# Patient Record
Sex: FEMALE | Race: AMERICAN INDIAN OR ALASKA NATIVE
[De-identification: names, ages, dates, MRNs, and addresses within clinical notes are randomized per-mention and may not be internally consistent; named-entity substitution may affect disease eponyms.]

---

## 2017-01-12 NOTE — XRAY REPORT
CHEST TWO VIEWS: 01/12/17 11:19:00



CLINICAL: Hypertension.



COMPARISON: 09/30/11



FINDINGS: Normal heart and pulmonary vasculature.Slightly increased 

aortic ectasia.  The lungs are normally expanded and clear.Degenerative 

change in spine.



IMPRESSION: No acute cardiopulmonary process.  Increased aortic 

ectasia.  Consider CT for further evaluation of the aorta.

## 2017-02-08 NOTE — ADMIT CRITERIA FORM
Admission Criteria Documentation: 





            AMBULATORY SURGERY  EXCEPTION CRITERIA 





Ambulatory Surgery Exception Criteria


                                                                               (

Place 'X' for any and all applicable criteria): 





Surgery or procedure performed on ambulatory basis may require inpatient stay 

for[A] ANY ONE of the following(1)(2)(3)(4)(5)(6)(7)(8)(9):


[X] I.   A preoperative situation, condition, or finding that warrants 

inpatient stay as indicated by ANY  ONE of the following:


   []   a)   Inpatient care needed because of severity of a disease or 

condition rather than the surgery (eg, 


              severe cardiac or respiratory disease, severe infection) (15) (16

) (17) (18)


   []   b)   Emergent procedure (eg, angioplasty for acute ischemia)(19)


   []   c)   Complex surgical approach or situation as indicated by ANY  ONE of 

the following(3):


        []   i)    Open approach needed instead of usual endoscopic, 

transcatheter, or other less invasive procedure


        []   ii)   Difficult approach because of previous operation


        []   iii)  Airway monitoring required after open neck procedures(20)(21)


        []   iv)  Large mass requiring unusually extensive dissection


        []   v)  Additional complicating feature requiring inpatient care (eg, 

drain management)(22(23):


   [X]   d)   Major surgery in a pt with high anesthetic risk as indicated by 

ANY  ONE of the following (2)(3)(5)(7)(8):


        [X]   i)     ASA risk class III or higher (severe systemic disease 

impairing function) [D]


        []   ii)    Advanced age (eg, older than 85 years)(14)(24)             

                   


        []   iii)   Symptomatic heart failure(25)


        []   iv)   Symptomatic asthma or COPD(8)(21)


        []    v)   Morbid obesity with hemodynamic or respiratory problems(20)(

21)(26)(27)


        []   vi)   Obstructive sleep apnea(20)(21)


        []   vii)   Former premature infants who are younger than 60 weeks


        []   viii)  High risk for severe postoperative abnormalities (eg, 

severe postoperative hypocalcemia 


                    after parathyroidectomy for severe hyperparathyroidism)(27)(

28)


        []   ix)   Unstable angina(25) 


   []   e)  Drug-related risk requiring inpatient stay as indicated by ANY  ONE

  of the following(5)(10)(14)(32)(33)


        []   i)     Procedure requires discontinuing drugs or other therapy (eg

, antiarrhythmic    medication,   


                    antiseizure medication), which necessitates inpatient 

observation or treatment.(18)(31)


        []   ii)    Major surgery and high risk drug use as indicated by ANY  

ONE   of the following:


             []     1)    Active abuse of cocaine or similar drug      


             []     2)    Monoamine oxidase inhibitor use


             []     3)    Other drug identified as posing risk


   []   f)   Inadequate outpatient care situation as indicated by ANY  ONE  of 

the following(5)(10)(14)(32)(33)


        []   i)   Patient lives remote from medical facility and procedure has 

urgent complication potential, 


                  and temporary nearby residence cannot be arranged


        []  ii)   Patient will have postprocedure incapacitation and inadequate 

assistance at home, or alternative level of care cannot be arranged.


        []  iii)   Patient will have long general anesthesia or procedure side 

effect resolution time, and 


                  competent person to stay with patient on first postoperative 

night at home or alternative  level of care cannot be arranged.


        []iv)    Other inadequate outpatient situation that cannot be handled 

by other means


[] II.  A perioperative event, condition, or finding that warrants inpatient 

stay as indicated by ANY  ONE of the following (1)(2)(3):        


   []   a)   Inadequate physiologic recovery: cardiovascular,  respiratory, or 

hemodynamic status not normal or near preoperative baseline(18)                

                                                                               

          


   []   b)   Hemodynamic instability


   []   c)   Patient not alert with near normal or baseline mental status


   []   d)   Temperature not normal or as expected and not appropriate for 

outpatient treatment of condition 


   []   e)   Ambulatory or appropriate activity level status not yet achieved 

post procedure [E](34)(35)(36)


   []   f)    Operative site not appropriate (eg, unexpected or excessive 

drainage or bleeding)


   []   g)   Postoperative effects not resolved or adequately managed (eg, 

significant pain or vomiting not 


              appropriate for outpatient or next level of care)(10)(12)


   []   h)   Complicating features requiring inpatient care as indicated by ANY

  ONE  of the following(37):


        []   i)    Severe complications of procedure (eg, bowel injury, airway 

compromise, vascular injury,severe hemorrhage)


        []   ii)   Extensive (eg, dissection far beyond usual scope of procedure

) or prolonged (eg, 120 minutes  


                   beyond usual) surgery needed requiring inpatient 

postoperative care


        []   iii)  Conversion to an open or complex procedure that requires 

inpatient care (eg, open vs 


                   laparoscopic cholecystectomy, abdominal vs vaginal 

hysterectomy)(38)


        []   iv)  Comorbid condition or test result identified during or post 

procedure that requires inpatient care (7)


        []   v)   Malignant hyperthermia(30)


        []   vi)  Other complicating feature requiring inpatient care(22)(23)











Inpatient stay may be needed until ALL of the following are present (1)(2)(3)(4)

(5)(6)(10)(14)(33)(40):


[]a)   Physiologic recovery: cardiovascular, respiratory, and hemodynamic 

status normal or near preoperative baseline


[]b)   Hemodynamic stability    


[]c)   Patient alert, with near normal or baseline mental status


[]d)   Temperature appropriate: patient afebrile or temperature appropriate for 

outpt treatment of condition 


[]e)   Activity level appropriate: ambulatory or appropriate activity level 

post procedure


[]f)   Operative site appropriate as indicated by ALL of the following:


       []i)    Site dry or with expected drainage 


       []ii)   Any blood noted is as expected for procedure.


[]g)  Postoperative effects resolved or managed as indicated by ALL of the 

following:


       []i)    Pain management appropriate for outpatient (or next level of) 

care(10)


       []ii)   Minimal nausea and vomiting: if present, successfully treated 

with oral medication(12)


       []iii)   Headache, dizziness, or drowsiness (if present) are mild.


[]h)  Voiding status acceptable as indicated by ANY  ONE  of the following:


       []i)    Voiding spontaneously    


       []ii)   No voiding but instructions given for follow-up in 6 to 8 hours 


       []iii)  Urinary catheter in place, and instructions given for follow-up


[]i)   Complicating features requiring inpatient care manageable at a lower 

level of care(37)


[]j)   Comorbid conditions manageable at a lower level of care(37)











The original [employer] content created by [employer] has been revised. 


The portions of the content which have been revised are identified through the 

use of italic text or in bold, and [facility] [employer] 


has neither reviewed nor approved the modified material. All other unmodified 

content is copyright  [employer].





Please see references footnoted in the original [employer] edition 

2016                                                                    


   


Admission Criteria Met: Yes

## 2017-02-09 NOTE — ANESTHESIA CONSULTATION
Anesthesia Consult and Med Hx





- Airway


Anesthetic Teeth Evaluation: Dentures (top)


ROM Head & Neck: Adequate


Mental/Hyoid Distance: Adequate


Mallampati Class: Class II


Intubation Access Assessment: Probably Good





- Pulmonary Exam


CTA: Yes





- Cardiac Exam


Cardiac Exam: RRR





- Pre-Operative Health Status


ASA Pre-Surgery Classification: ASA3


Proposed Anesthetic Plan: Epidural (Hx PONV), Spinal





- Pulmonary


Hx Smoking: No


Hx Sleep Apnea: No (MARK PRE SCREEN HIGH RISK)





- Cardiovascular System


Hx Hypertension: Yes (X 20 YRS; high cholesterol)





- Endocrine


Hx Non-Insulin Dependent Diabetes: Yes





- Hematic


Hx Anemia: Yes (AND LOW IRON)





- Other Systems


Hx Cancer: No


Hx Obesity: Yes

## 2017-02-09 NOTE — OPERATIVE REPORT
PREOPERATIVE DIAGNOSES:

1.  Severe advanced osteoarthritis, right hip joint.

2.  Stiff right hip joint.



POSTOPERATIVE DIAGNOSES:

1.  Severe advanced osteoarthritis, right hip joint.

2.  Stiff right hip joint.



PROCEDURE PERFORMED:  Right total hip replacement, complex.



COMPLICATIONS:  None.



BLOOD LOSS:  300 mL approximately.



IMPLANTS USED:  Hima acetabular component size 52 titanium cluster holes

supplemented with 16.5 mm screw, 25 mm liner, 36 mm 0-degree insert, stem size 4

Accolade  degrees stem, head 36 mm with -2.5 length.



SURGEON:  Martínez Miller M.D.



ASSISTANT:  Emerald operative tech, orthopedic tech and Graham operative tech

as well.



COMPLICATIONS:  None.



BRIEF HISTORY:  The patient is a 62-year-old lady who failed severe advanced

osteoarthritis, right hip joint, failed conservative treatment, opted for

surgical intervention.  Risk and benefit discussed, informed consent obtained,

brought to the hospital for the above procedure.



DETAILS OF THE OPERATIVE REPORT:  The patient was taken to the operating room. 

After smooth spinal, epidural anesthesia, placed supine on the operating room

table.  Tobias catheter placed.  The patient was placed in the left lateral

decubitus position with the right side up.  Right hip and right lower extremity

prepped and draped in sterile fashion.  After all the bony prominences were

carefully padded axillary roll was placed.  The right hip was prepped and draped

in sterile fashion.  Posterolateral approach was made.  Incision was a

longitudinal incision approximately 15 cm longitudinal incision was made. 

Incision was carried down deep to subcutaneous tissue.  Tensor fascia was

exposed.  Tensor fascia incised in the interval between gluteus ahmet and

tensor fascia muscle.  Short external rotators and posterior external rotators

and hip abductors were identified.  Gluteus minimus muscle was identified and

retracted.  Short external rotators were tacked with 0 Vicryl sutures, tacked

and taken off from the trochanter using a Bovie.  Posterior capsule was exposed

after base capsulotomy was performed and tacked with 0 Ethibond sutures.  Hip

joint was identified, dislocated and femoral neck osteotomy was performed about

12-14 mm proximal to the lesser trochanter as preop templated.  LTC was measured

before the neck osteotomy it was about 53 mm, exactly what we measured on the

preop templating.  Neck osteotomy performed a head was delivered out. 

Significant amount of osteophytes all round, very stiff hip joint insignificant

synovial hypertrophic perforation was found, which made the procedure more time

consuming in order to do synovectomy was performed and ____ release was

performed.  A head was delivered out, _____ significant osteophytes and no

cartilage on the femoral side.  ____ retractor was placed.  Anterior, posterior,

inferior retractors were placed _____ close to the bone.  Labrum was removed

_____ cleaned.  We started reaming with size 46 reamer gradually increased the

size to 51 reamer which gave us good fit and decided to go with the 52 cup,

hemispherical cup in about 40 degree abduction and 20-degree forward flexed

position supplemented with 16.5 mm screw.  Impinging osteophytes were removed,

anterior, inferior and some posterior of those were removed.  The trial liner

was placed.  Attention was then drawn on the femoral side.  We prepared, cleaned

the junction of the trochanter and the neck and started preparing for the

Accolade II stem in about 15 degrees anteverted position with box osteotome

canal finder and then started broaching with size 0 broach gradually increased

the size to size 4 in about 15 degree anteverted position, which gave us great

stability for ____ testing.  Following this, we decided to trial with a standard

and extended offset head, neck and 0 and -25 head.  Preop templating suggested

extended offset that is what we found that to be acceptable intraoperative as

well with 0 and -2.5 head. ____ before the neck cut LTC was 53 with -2.5 head. 

The hip found to be extremely stable in that trial.  With this trial we had

flexion up to 130 degrees with 90 degree flexion and abducted position, we can

internally rotate up to 80-90 degrees in 90 degree flexed and abducted position,

we could internally rotate up to 70-80 degree, no dislocation, no impingement. 

Full extension and external rotation 50-60 degrees in full extension, no

impingement.  We decided to go with the real components; we removed the trial

components, thoroughly washed the hip area with antibiotic-soaked normal saline

followed by normal saline.  Real liner was impacted in place and well seated,

confirmed.  As the real stem was then inserted and well seated, trunnion was

cleaned, dried, and real BIOLOX delta ceramic femoral head was inserted,

impacted in place and hip joint was then reduced again moved through range of

motion and found to be extremely stable.  Posterior capsule, short external

rotators were tacked through drill holes made in the greater trochanter and

tensor fascia was closed with 0 Vicryl sutures interrupted, subcutaneous tissue

was closed with 0 and 2-0 Vicryl interrupted sutures, skin was closed with

Monocryl.  Aquacel dressing was done.  Abduction pillow applied.  The patient

tolerated the procedure well, shifted to recovery room in stable condition. 

Sponge and needle count was correct.





DD: 02/09/2017 12:38

DT: 02/09/2017 18:21

JOB# [id number]  [id number]

SK/DYLAN

## 2017-02-09 NOTE — POST ANESTHESIA EVALUATION
- Post Anesthesia Evaluation


Patient Participated: Yes


Airway Patent: Yes


Stable Respiratory Function: Yes


Nausea/Vomiting: No


Temp > 96.8F: Yes


Pain Manageable: Yes


Adequeate Hydration: Yes


Anesthesia Complications: No


Block Receding Appropriately: Yes


Patient on Ventilator: No

## 2017-02-09 NOTE — XRAY REPORT
AP PELVIS:



History: Right hip replacement.



Right hip arthroplasty changes are noted. The hardware appears well 

applied. There is no evidence for fracture or malalignment. The 

visualized pelvic bones are intact.



IMPRESSION:

Stable appearance of the right hip prosthesis.

## 2017-02-10 NOTE — XRAY REPORT
Single fluoroscopic image right hip:



History:  for right hip during surgery.



Findings:



Distal hip replacement noted. The acetabular and the femoral component 

appears anatomic and in alignment. No dislocation or fracture.



Impression:



Stable right total hip.

## 2017-02-10 NOTE — CONSULTATION
History of Present Illness





- Reason for Consult


Consult date: 02/09/17


Medical management


Requesting physician: DANIE PRYOR





- History of Present Illness


S/p Rt PATRICIA sec to  degenerative arthritis-doing well








Past History


Past Medical History: diabetes, hypertension, hyperlipidemia


Past Surgical History: total hip replacement (Right)


Social history: lives with family


Family history: hypertension





Medications and Allergies


 Allergies











Allergy/AdvReac Type Severity Reaction Status Date / Time


 


No Known Allergies Allergy   Verified 01/25/17 10:05











 Home Medications











 Medication  Instructions  Recorded  Confirmed  Last Taken  Type


 


AtorvaSTATin [Lipitor] 40 mg PO DAILY 01/25/17 02/09/17 02/08/17 19:00 History





    40MG 


 


Fenofibrate [Lofibra] 160 mg PO QDAY 01/25/17 02/09/17 02/08/17 19:00 History





    160MG 


 


Metformin HCl [Metformin HCl ER] 500 mg PO BID 01/25/17 02/09/17 02/08/17 19:00 

History





    500MG 


 


amLODIPine/VALSARTAN [Exforge 1 tab PO DAILY 01/25/17 02/09/17 02/09/17 05:00 

History





5-160 mg Tablet]    1 TAB 











Active Meds: 


Active Medications





Amlodipine Besylate (Norvasc)  5 mg PO QDAY [facility]


   Last Admin: 02/10/17 10:12 Dose:  5 mg


Aspirin (Aspirin)  325 mg PO BID [facility]


   Last Admin: 02/10/17 10:09 Dose:  325 mg


Atorvastatin Calcium (Lipitor)  40 mg PO DAILY [facility]


   Last Admin: 02/10/17 10:10 Dose:  40 mg


Celecoxib (Celebrex)  200 mg PO BID [facility]


   Last Admin: 02/10/17 10:09 Dose:  200 mg


Fenofibrate (Tricor)  145 mg PO DAILY [facility]


Gabapentin (Neurontin)  300 mg PO Q8HR [facility]


   Last Admin: 02/10/17 06:05 Dose:  300 mg


Sodium Chloride (Nacl 0.9% 1000 Ml)  1,000 mls @ 100 mls/hr IV AS DIRECT [facility]


   Last Admin: 02/10/17 10:08 Dose:  100 mls/hr


Magnesium Hydroxide (Milk Of Magnesia)  30 ml PO Q4H PRN


   PRN Reason: Constipation


Metformin HCl (Glucophage Xr)  500 mg PO BIDDIAB [facility]


   Last Admin: 02/10/17 10:08 Dose:  500 mg


Morphine Sulfate (Morphine)  4 mg IV Q4H PRN


   PRN Reason: Pain , Severe (7-10)


   Last Admin: 02/10/17 12:13 Dose:  4 mg


Ondansetron HCl (Zofran)  4 mg IV Q8H PRN


   PRN Reason: Nausea And Vomiting


   Last Admin: 02/09/17 17:56 Dose:  4 mg


Oxycodone/Acetaminophen (Percocet 5/325)  1 tab PO Q4H PRN


   PRN Reason: Pain, Moderate (4-6)


Promethazine HCl (Phenergan)  25 mg MO Q6H PRN


   PRN Reason: Nausea And Vomiting


Sodium Chloride (Sodium Chloride Flush Syringe 10 Ml)  10 ml IV PRN NR


   Stop: 02/12/17 12:59


Valsartan (Diovan)  160 mg PO QDAY [facility]


   Last Admin: 02/10/17 10:11 Dose:  160 mg











Review of Systems


All systems: negative


Musculoskeletal: morning stiffness





Exam





- Constitutional


Vitals: 


 











Temp Pulse Resp BP Pulse Ox


 


 98.9 F   81   20   125/65   99 


 


 02/10/17 08:00  02/10/17 10:12  02/10/17 08:00  02/10/17 10:12  02/10/17 08:00











General appearance: Present: no acute distress, well-nourished





- EENT


Eyes: Present: PERRL


ENT: hearing intact, clear oral mucosa





- Neck


Neck: Present: supple, normal ROM





- Respiratory


Respiratory effort: normal


Respiratory: bilateral: CTA





- Cardiovascular


Heart Sounds: Present: S1 & S2.  Absent: rub, click





- Extremities


Extremities: pulses symmetrical, No edema


Peripheral Pulses: within normal limits





- Abdominal


General gastrointestinal: Present: soft, non-tender, non-distended, normal 

bowel sounds


Female genitourinary: Present: normal





- Integumentary


Integumentary: Present: clear, warm, dry





- Musculoskeletal


Musculoskeletal: gait normal, strength equal bilaterally





- Psychiatric


Psychiatric: appropriate mood/affect, intact judgment & insight





- Neurologic


Neurologic: CNII-XII intact, moves all extremities





- Allied Health


Allied health notes reviewed: nursing, case management





Results





- Labs


CBC & Chem 7: 


 02/10/17 04:54





 02/10/17 04:54


Labs: 


 Abnormal lab results











  02/10/17 02/10/17 02/10/17 Range/Units





  04:54 04:54 07:38 


 


Hgb  9.5 L    (10.1-14.3)  gm/dl


 


Hct  29.0 L    (30.3-42.9)  %


 


Chloride   107.5 H   ()  mmol/L


 


BUN   23 H   (7-17)  mg/dL


 


Glucose   149 H   ()  mg/dL


 


POC Glucose    141 H  ()  


 


Calcium   7.6 L   (8.4-10.2)  mg/dL














Assessment and Plan





- Patient Problems


(1) History of total hip replacement


Current Visit: Yes   Status: Acute   


Qualifiers: 


   Laterality: right   Qualified Code(s): Z96.641 - Presence of right 

artificial hip joint   


Plan to address problem: 


Post op doing well








(2) HLD (hyperlipidemia)


Current Visit: Yes   Status: Acute   


Qualifiers: 


   Hyperlipidemia type: mixed hyperlipidemia   Qualified Code(s): E78.2 - Mixed 

hyperlipidemia   





(3) HTN (hypertension)


Current Visit: Yes   Status: Chronic   


Qualifiers: 


   Hypertension type: essential hypertension   Qualified Code(s): I10 - 

Essential (primary) hypertension   





(4) T2DM (type 2 diabetes mellitus)


Current Visit: Yes   Status: Acute   


Qualifiers: 


   Diabetes mellitus complication status: without complication   Diabetes 

mellitus complication detail: D   Diabetic retinopathy severity: D   

Proliferative retinopathy type: P   Diabetes mellitus macular edema: D   

Diabetes mellitus long term insulin use: without long term use   Laterality: L 

  Chronic kidney disease stage: C   Qualified Code(s): E11.9 - Type 2 diabetes 

mellitus without complications   


Plan to address problem: 


Cont metformin  and coverage








(5) DVT prophylaxis


Current Visit: Yes   Status: Acute   


Plan to address problem: 


ASA

## 2017-02-10 NOTE — DISCHARGE SUMMARY
Providers





- Providers


Date of Admission: 


02/09/17 06:18





Date of discharge: 02/10/17


Attending physician: 


DANIE PRYOR





 





02/09/17 12:21


Consult to Case Management [CONS] Routine 


   Services Needed at Discharge: DME Equipment


                                   


   Notified:: RISSA Zuñiga


   Phone number called:: Ext: [phone]


   Was contact made?: Yes


   If yes, spoke with:: Zara


   Time called:: 15:00


   Comment:: Pt has been seen today


   Additional Physician Instructions: patient wants to go to REHAB PLACE. SHE 

DOESNOT HAVE ANY HELP AT HOME


Consult to Physician [CONS] Routine 


   Consulting Provider: TAMI MARTINEZ


   Reason For Exam: medical management


   Place consult to:: Dr. Martinez


   Notified:: yes


   Was contact made?: Yes


   If yes, spoke with:: Dr. Martinez


   Time called:: 17:00


Occupational Therapy Evaluate and Treat [CONS] Routine 


   Comment: POSTERIOR HIP PRECAUTIONS,HIGH BED/CHAIR/TOILET SE


   Reason For Exam: TOTAL HIP





02/09/17 12:22


Physical Therapy Evaluation and Treat [CONS] Routine 


   Comment: POSTERIOR HIP PRECAUTIONS, HIGH CHAIR HIGH BED


   Reason For Exam: TOTAL HIP POSTOP


   Mode of Transport?: Walker


   Weight bearing status?: Full wt bearing


   Assistive devices?: Yes











Primary care physician: 


PRIMARY CARE MD








Hospitalization


Hospital course: 





S/p Rt PATRICIA-Doing well.Accepted to acute rehab


Disposition: DC/TX INPT REHAB FACILITY


Time spent for discharge: 31 minutes





- Discharge Diagnoses


(1) History of total hip replacement


Status: Acute   


Qualifiers: 


   Laterality: right   Qualified Code(s): Z96.641 - Presence of right 

artificial hip joint   





(2) HLD (hyperlipidemia)


Status: Acute   


Qualifiers: 


   Hyperlipidemia type: mixed hyperlipidemia   Qualified Code(s): E78.2 - Mixed 

hyperlipidemia   





(3) HTN (hypertension)


Status: Chronic   


Qualifiers: 


   Hypertension type: essential hypertension   Qualified Code(s): I10 - 

Essential (primary) hypertension   





(4) T2DM (type 2 diabetes mellitus)


Status: Acute   


Qualifiers: 


   Diabetes mellitus complication status: without complication   Diabetes 

mellitus complication detail: D   Diabetic retinopathy severity: D   

Proliferative retinopathy type: P   Diabetes mellitus macular edema: D   

Diabetes mellitus long term insulin use: without long term use   Laterality: L 

  Chronic kidney disease stage: C   Qualified Code(s): E11.9 - Type 2 diabetes 

mellitus without complications   





(5) DVT prophylaxis


Status: Acute   





Core Measure Documentation





- Palliative Care


Palliative Care/ Comfort Measures: Not Applicable





- Core Measures


Any of the following diagnoses?: none





Exam





- Constitutional


Vitals: 


 











Temp Pulse Resp BP Pulse Ox


 


 98.9 F   81   20   125/65   99 


 


 02/10/17 08:00  02/10/17 10:12  02/10/17 08:00  02/10/17 10:12  02/10/17 08:00











General appearance: Present: no acute distress, well-nourished





- EENT


Eyes: Present: PERRL


ENT: hearing intact, clear oral mucosa





- Neck


Neck: Present: supple, normal ROM





- Respiratory


Respiratory effort: normal


Respiratory: bilateral: CTA





- Cardiovascular


Heart Sounds: Present: S1 & S2.  Absent: rub, click





- Extremities


Extremities: pulses symmetrical, No edema


Peripheral Pulses: within normal limits





- Abdominal


General gastrointestinal: Present: soft, non-tender, non-distended, normal 

bowel sounds


Female genitourinary: Present: normal





- Integumentary


Integumentary: Present: clear, warm, dry





- Musculoskeletal


Musculoskeletal: gait normal, strength equal bilaterally





- Psychiatric


Psychiatric: appropriate mood/affect, intact judgment & insight





- Neurologic


Neurologic: CNII-XII intact, moves all extremities





- Allied Health


Allied health notes reviewed: nursing





Plan


Weight Bearing Status: Weight Bear as Tolerated


Durable Medical Equipment Needed Upon Discharge: Walker-Standard


Follow up with: 


PRIMARY CARE,MD [Primary Care Provider] - 7 Days

## 2017-02-10 NOTE — PROGRESS NOTE
Subjective


Date of service: 02/10/17


Interval history: 





pain under control, avss, nvi


dressing dry, did well with pt


hip precuations explained. pillow explained.








Objective


Vital signs: 


 Vital Signs - 12hr











  02/10/17 02/10/17 02/10/17





  05:59 08:00 10:11


 


Temperature 99.8 F H 98.9 F 


 


Pulse Rate   81


 


Pulse Rate [ 81 80 





Left]   


 


Respiratory 20 20 





Rate   


 


Blood Pressure   125/70


 


Blood Pressure 125/65 98/55 





[Left Arm]   


 


O2 Sat by Pulse 100 99 





Oximetry   














  02/10/17





  10:12


 


Temperature 


 


Pulse Rate 81


 


Pulse Rate [ 





Left] 


 


Respiratory 





Rate 


 


Blood Pressure 125/65


 


Blood Pressure 





[Left Arm] 


 


O2 Sat by Pulse 





Oximetry 














- Labs


CBC & BMP: 


 02/10/17 04:54





 02/10/17 04:54


Labs: 


 Abnormal lab results











  02/10/17 02/10/17 02/10/17 Range/Units





  04:54 04:54 07:38 


 


Hgb  9.5 L    (10.1-14.3)  gm/dl


 


Hct  29.0 L    (30.3-42.9)  %


 


Chloride   107.5 H   ()  mmol/L


 


BUN   23 H   (7-17)  mg/dL


 


Glucose   149 H   ()  mg/dL


 


POC Glucose    141 H  ()  


 


Calcium   7.6 L   (8.4-10.2)  mg/dL
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
## 2017-02-13 NOTE — IRU PLAN OF CARE
Interdisciplinary Plan of Care





- IP


IRU INTERDISCIPLINARY PLAN: 


 [facility] Inpatient [facility]hab Unit Plan of Care





IRU Interdisciplinary Care Plan                            Start:  02/10/17 19:

53


Freq:   Admission then PRN                                 Status: Active      

  


 Document     02/13/17 10:21  DB  (Rec: 02/13/17 10:25  DB  [id number])


 Interdisciplinary Problem List


     Interdisciplinary Problem List


      Interdisciplinary Problem List             Impaired Bathing/Grooming


       Query Text:Answers will Trigger Problems  Impaired Dressing


       and Outcomes on Worklist.                 Impaired Mobility


                                                 Impaired Transfers


                                                 Impaired Toileting


                                                 Pain Management


                                                 Knowledge Deficits


                                                 Impaired Skin/Tissue Integrity


                                                 Impaired Home Management


                                                 Impaired Safety


                                                 Medications Education


                                                 Diabetes Education


 IRU Interdisciplinary Care Plan


     Therapy Services


      Therapy Services Will Include:             Physical Therapy


       Query Text:Patient will be seen for a     Occupational Therapy


       minimum of 3 hours of daily therapy 5     


       out of 7 days a week.  Therapy intensity  


       may be adjusted within a 7 consecutive    


       day period to effectively serve the       


       individual needs of the patient.          


     Treatment Frequency/Intensity/Duration


      Treatment Frequency                        5 days per week


      Treatment Intensity                        1.5 hours per discipline (PT/OT

) daily


      Treatment Duration                         10-14 days


     Problem Area: Eating/Swallowing


      Eating/Swallowing Outcomes                 


      Eating/Swallowing Interventions            


     Problem Area: Bathing/Grooming


      Bathing/Grooming Outcomes                  Improve [place] w/


                                                 Grooming


                                                 Improve [place] w/


                                                 Bathing


      Bathing/Grooming Interventions             ADL Training


                                                 Therapeutic Activity


                                                 Patient/Caregiver Education


     Problem Area: Dressing


      Dressing Outcomes                          Improve [place] w/ UB


                                                 Dressing


                                                 Improve [place] w/ LB


                                                 Dressing


      Dressing Interventions                     ADL Training


                                                 Use of Assistive Devices


                                                 Neuromuscular Re-Education


                                                 Balance Work


                                                 Patient/Caregiver Education


     Problem Area: Mobility


      Mobility Outcomes                          Improve [place] w/ Bed


                                                 Mobility


                                                 Improve [place] w/


                                                 Ambulation


                                                 Improve [place] w/ Stairs


                                                 /Curb


                                                 Improve [place] w/


                                                 Wheelchair


      Mobility Interventions                     Therapeutic Exercise


                                                 Neuromuscular Re-Ed.


                                                 Activity Tolerance Work


                                                 Use of Assistive Devices


                                                 Patient/Caregiver Education


                                                 Bed Mobility Work


                                                 Gait Training


                                                 W/C Mobility Work


     Problem Area: Transfers


      Transfers Outcomes                         Improve [place] w/ Bed


                                                 Transfers


                                                 Improve [place] w/ Toilet


                                                 Transfers


                                                 Improve [place] w/ Tub/


                                                 Shower Transfers


                                                 Improve [place] w/ Car


                                                 Transfers


      Transfers Interventions                    Transfer Training


                                                 Therapeutic Exercise


                                                 Neuromuscular Re-Education


                                                 Activity Tolerance Work


                                                 Modalities


                                                 Use of Assistive Devices


                                                 Patient/Caregiver Education


     Problem Area: Bowel/Bladder Managment


      Bowel/Bladder Outcomes                     


      Bowel/Bladder Interventions                


     Problem Area: Toileting


      Toileting Outcomes                         Improve [place] w/


                                                 Toileting


      Toileting Interventions                    ADL Training


                                                 Balance Work


                                                 Use of Assistive Devices


                                                 Patient/Caregiver Education


     Problem Area: Nutrition


      Nutrition Outcomes                         Understand and Comply w/ Diet


      Nutrition Interventions                    Nutritional Counseling


                                                 Monitor Nutrient Intake


                                                 Patient/Caregiver Education


     Problem Area: Comprehension


      Comprehension Outcomes                     


      Comprehension Interventions                


     Problem Area: Expression


      Expression Outcomes                        


      Expression Interventions                   


     Problem Area: Problem Solving


      Problem Solving Outcomes                   


      Problem Solving Interventions              


     Problem Area: Memory


      Memory Outcomes                            


      Memory Interventions                       


     Problem Area: Pain Management


      Pain Management Outcomes                   Demonstrate/Verbalize Pain


                                                 Strategies


      Pain Management Interventions              Medication Management


                                                 Use of Devices/Modalities (


                                                 TENS, hot pack, cold pack, etc


                                                 .)


                                                 Positioning/Turning


                                                 Patient/Caregiver Education


     Problem Area: Knowledge Deficits


      Knowledge Deficits Outcomes                Demonstrate Ability to Manage


                                                 Blood Glucose


                                                 Demonstrate Understanding of


                                                 Anticoagulation


                                                 Verbalize Precautions


      Knowledge Deficits Interventions           Disease/Injury/Sx.


                                                 Intervention Education


                                                 Medication Use Education


                                                 Body Mechanics/Joint


                                                 Protection Education


                                                 Disease Management Education


                                                 Health Maintainence Education


                                                 Safety Education


     Problem Area: Skin/Tissue Integrity


      Skin/Tissue Integrity Outcomes             Exhibit Healing of Wound/


                                                 Incision


                                                 Demonstrate Understanding of


                                                 Pressure Relief


      Skin/Tissue Integrity Interventions        Skin/Wound Care


                                                 Pressure Relief Instruction


                                                 Dressing Change Education


                                                 Positioning/Turning


     Problem Area: Social Interaction


      Social Interaction Outcomes                


      Social Interaction Interventions           


     Problem Area: Adjustment to Disability


      Adjustment to Disability Outcomes          


      Adjustment to Disability Interventions     


     Problem Area: Discharge Concerns


      Discharge Concerns Outcomes                Discharge Home w/ Necessary


                                                 Equipment


                                                 Have Home Health/Outpatient


                                                 Services


      Discharge Concerns Interventions           Discharge Planning


                                                 Family/Caregiver Conference


                                                 Family/Caregiver Training


     Problem Area: Community Reintegration


      Community Reintegration Outcomes           Demonstrate Understanding of


                                                 Community Resources


      Community Reintegration Interventions      Provide Community Resources


     Problem Area: Home Management


      Home Management Outcomes                   Improve [place] w/ Home


                                                 Management


      Home Management Interventions              Meal Preparation


                                                 Clothing Care


     Problem Area: Safety


      Safety Outcomes                            Provide Safe Environment


                                                 Perform Selfcare Safely


                                                 Demonstrate Good Safety w/


                                                 Transfers/Mobility


      Safety Interventions                       Identify Fall Risk


                                                 [place] Pt. to Environment


                                                 Reduce Environmental Hazards


     Problem Area: Medication Education


      Medication Education Outcomes              Patient/Caregiver will


                                                 Verbalize Understanding of


                                                 Medications


      Medication Education Interventions         Explain Administration/Side


                                                 Effects/Interactions


     Problem Area: Diabetes Education


      Diabetes Education Outcomes                Demonstrate Knowledge of


                                                 Resources Availlable in


                                                 Diabetic Ed. Folder


      Diabetes Education Interventions           Give Pt. Diabetes Education


                                                 Folder


                                                 Discuss Pathophysiology of


                                                 Diabetes


     Problem Area: Oxygenation


      Oxygenation Outcomes                       


      Oxygenation Interventions                  


     Problem Area: Cardiovascular


      Cardiovascular Outcomes                    


      Cardiovascular Interventions               


     Physician Only


      Medical Prognosis and Rehabilitation       Patient demonstrates good


       Potential (Completed by Physician)        rehab potential.


                                                 Medical Prognosis:  Good





This plan of care has been developed based on the findings from the pre-

admission assessment, post admission physician evaluation, information gathered 

from the assessments from all therapy disciplines and other pertinent 

clinicians. The plan of care has been reviewed and discussed in collaboration 

with the interdisciplinary team. The plan of care will be reviewed and updated 

at least weekly.





62 y.o. female s/p right PATRICIA.  The patient remains at risk for falls, 

dislocation, uncontrolled pain, UTI, DVT, PE, hypoglycemia, infection, syncope, 

hypotension.  Fever resolved that was noted over weekend; UA, urine culture 

negative, leukocytosis resolving; anemia resolved; hypokalemia resolved; 

constipation resolved.  Pt is tolerating therapies and progressing towards 

functional independence of Kulwinder.  Pt remains an appropriate candidate for IPR 

course.

## 2017-02-13 NOTE — PROGRESS NOTE
Assessment and Plan


62 y.o. female s/p right PATRICIA





- s/p right PATRICIA- hip precautions, pain controlled; wound care


- unsteady gait- supervision/CGA for gait up to 180 feet


- HTN/DM- stable on current regimen


- acute blood loss anemia- resolved


- leukocytosis- improving; fever resolved; UA, C&S negative


- hypokalemia- resolved


- constipation- resolved


- hyponatremia- follow


- DVT px- ASA BID per Ortho








- Patient Problems


(1) History of total hip replacement


Current Visit: Yes   Status: Acute   


Qualifiers: 


   Laterality: right   Qualified Code(s): Z96.641 - Presence of right 

artificial hip joint   





(2) Unsteady gait


Current Visit: Yes   Status: Acute   





(3) T2DM (type 2 diabetes mellitus)


Current Visit: Yes   Status: Chronic   


Qualifiers: 


   Diabetes mellitus complication status: with hyperglycemia   Diabetes 

mellitus complication detail: D   Diabetic retinopathy severity: D   

Proliferative retinopathy type: P   Diabetes mellitus macular edema: D   

Diabetes mellitus long term insulin use: without long term use   Laterality: L 

  Chronic kidney disease stage: C   Qualified Code(s): E11.65 - Type 2 diabetes 

mellitus with hyperglycemia   





(4) HTN (hypertension)


Current Visit: Yes   Status: Chronic   


Qualifiers: 


   Hypertension type: essential hypertension   Qualified Code(s): I10 - 

Essential (primary) hypertension   





(5) Hyponatremia


Current Visit: Yes   Status: Acute   





Subjective


Date of service: 02/13/17


Principal diagnosis: right PATRICIA


Interval history: 


Pt seen in room this AM, F/U IPR course, right PATRICIA.  Pt reports being tired 

from therapies, however, overall improving








Objective





- Constitutional


Vitals: 


 Vital Signs - 12hr











  02/13/17 02/13/17 02/13/17





  07:30 08:31 08:40


 


Temperature 98 F  


 


Pulse Rate   87


 


Pulse Rate [ 87  





Brachial]   


 


Respiratory 18 20 





Rate   


 


Blood Pressure   119/68


 


Blood Pressure 119/68  





[Left Arm]   


 


O2 Sat by Pulse 98  





Oximetry   














  02/13/17





  16:22


 


Temperature 


 


Pulse Rate 100 H


 


Pulse Rate [ 





Brachial] 


 


Respiratory 





Rate 


 


Blood Pressure 119/66


 


Blood Pressure 





[Left Arm] 


 


O2 Sat by Pulse 





Oximetry 











General appearance: Present: no acute distress





- EENT


Eyes: EOM intact


ENT: hearing intact





- Neck


Neck: supple, normal ROM





- Respiratory


Respiratory effort: normal


Respiratory: bilateral: CTA





- Cardiovascular


Rhythm: regular


Heart Sounds: Present: S1 & S2


Extremity abnormal: edema (mild RLE)





- Gastrointestinal


General gastrointestinal: Present: soft, non-tender, non-distended, normal 

bowel sounds





- Neurologic


Neurologic: CNII-XII intact, moves all extremities





- Psychiatric


Psychiatric: appropriate mood/affect, intact judgment & insight, memory intact, 

cooperative





- Allied health notes


Allied health notes reviewed: PT (supervision/CGA for gait up to 180 feet), OT (

supervision for LB dressing)





- Labs


CBC & Chem 7: 


 02/13/17 15:19





 02/13/17 15:19


Labs: 


 Abnormal lab results











  02/12/17 02/12/17 02/12/17 Range/Units





  16:51 16:54 21:10 


 


WBC     (4.5-11.0)  K/mm3


 


RBC     (3.65-5.03)  M/mm3


 


RDW     (13.2-15.2)  %


 


Sodium     (137-145)  mmol/L


 


Chloride     ()  mmol/L


 


Creatinine     (0.7-1.2)  mg/dL


 


Glucose     ()  mg/dL


 


POC Glucose  219 H  214 H  151 H  ()  














  02/13/17 02/13/17 02/13/17 Range/Units





  06:21 11:40 15:19 


 


WBC    11.5 H  (4.5-11.0)  K/mm3


 


RBC    3.32 L  (3.65-5.03)  M/mm3


 


RDW    12.8 L  (13.2-15.2)  %


 


Sodium     (137-145)  mmol/L


 


Chloride     ()  mmol/L


 


Creatinine     (0.7-1.2)  mg/dL


 


Glucose     ()  mg/dL


 


POC Glucose  174 H  114 H   ()  














  02/13/17 02/13/17 Range/Units





  15:19 16:58 


 


WBC    (4.5-11.0)  K/mm3


 


RBC    (3.65-5.03)  M/mm3


 


RDW    (13.2-15.2)  %


 


Sodium  133 L D   (137-145)  mmol/L


 


Chloride  94.3 L   ()  mmol/L


 


Creatinine  0.6 L   (0.7-1.2)  mg/dL


 


Glucose  146 H   ()  mg/dL


 


POC Glucose   175 H  ()

## 2017-02-14 NOTE — PROGRESS NOTE
Assessment and Plan


62 y.o. female s/p right PATRICIA





- s/p right PATRICIA- hip precautions, pain well controlled; wound check on tomorrow


- unsteady gait- supervision with ambulation


- HTN/DM- remains stable


- DVT px- ASA BID per Ortho


- team conference held on today- pt is independent with eating; s/u to [facility] for 

remaining ADLs and transfers, goal of Kulwinder as pt will be home alone; 

supervision for gait up to 220 feet; supervision with stairs.  Tentative d/c 

date 2/16








- Patient Problems


(1) History of total hip replacement


Current Visit: Yes   Status: Acute   


Qualifiers: 


   Laterality: right   Qualified Code(s): Z96.641 - Presence of right 

artificial hip joint   





(2) Unsteady gait


Current Visit: Yes   Status: Acute   





(3) T2DM (type 2 diabetes mellitus)


Current Visit: Yes   Status: Chronic   


Qualifiers: 


   Diabetes mellitus complication status: with hyperglycemia   Diabetes 

mellitus complication detail: D   Diabetic retinopathy severity: D   

Proliferative retinopathy type: P   Diabetes mellitus macular edema: D   

Diabetes mellitus long term insulin use: without long term use   Laterality: L 

  Chronic kidney disease stage: C   Qualified Code(s): E11.65 - Type 2 diabetes 

mellitus with hyperglycemia   





(4) HTN (hypertension)


Current Visit: Yes   Status: Chronic   


Qualifiers: 


   Hypertension type: essential hypertension   Qualified Code(s): I10 - 

Essential (primary) hypertension   





Subjective


Date of service: 02/14/17


Principal diagnosis: right PATRICIA


Interval history: 


Pt seen between therapies this AM, F/U IPR course, right PATRICIA.  +BM overnight 

and this AM; pain remains well controlled








Objective





- Constitutional


Vitals: 


 Vital Signs - 12hr











  02/14/17 02/14/17 02/14/17





  07:30 09:08 09:18


 


Temperature 98.4 F  


 


Pulse Rate  105 H 105 H


 


Pulse Rate [ 102 H  





Left Brachial]   


 


Respiratory 20  





Rate   


 


Blood Pressure  144/66 144/66


 


Blood Pressure 144/66  





[Left Arm]   


 


O2 Sat by Pulse 97  





Oximetry   











General appearance: Present: no acute distress





- EENT


Eyes: EOM intact


ENT: hearing intact





- Neck


Neck: supple, normal ROM





- Respiratory


Respiratory effort: normal


Extremity abnormal: edema (RLE; no calf tenderness)





- Neurologic


Neurologic: CNII-XII intact, moves all extremities





- Psychiatric


Psychiatric: appropriate mood/affect, intact judgment & insight, memory intact, 

cooperative





- Labs


CBC & Chem 7: 


 02/13/17 15:19





 02/13/17 15:19


Labs: 


 Abnormal lab results











  02/13/17 02/13/17 02/13/17 Range/Units





  15:19 15:19 16:58 


 


WBC  11.5 H    (4.5-11.0)  K/mm3


 


RBC  3.32 L    (3.65-5.03)  M/mm3


 


RDW  12.8 L    (13.2-15.2)  %


 


Sodium   133 L D   (137-145)  mmol/L


 


Chloride   94.3 L   ()  mmol/L


 


Creatinine   0.6 L   (0.7-1.2)  mg/dL


 


Glucose   146 H   ()  mg/dL


 


POC Glucose    175 H  ()  














  02/13/17 02/14/17 02/14/17 Range/Units





  21:08 06:41 11:46 


 


WBC     (4.5-11.0)  K/mm3


 


RBC     (3.65-5.03)  M/mm3


 


RDW     (13.2-15.2)  %


 


Sodium     (137-145)  mmol/L


 


Chloride     ()  mmol/L


 


Creatinine     (0.7-1.2)  mg/dL


 


Glucose     ()  mg/dL


 


POC Glucose  155 H  177 H  139 H  ()

## 2017-02-15 NOTE — PROGRESS NOTE
Assessment and Plan


62 y.o. female s/p right PATRICIA





- s/p right PATRICIA- hip precautions, pain well controlled


- unsteady gait- supervision with ambulation


- HTN/DM- remains stable


- DVT px- ASA BID per Ortho


- tentative d/c home on tomorrow





- Patient Problems


(1) History of total hip replacement


Current Visit: Yes   Status: Acute   


Qualifiers: 


   Laterality: right   Qualified Code(s): Z96.641 - Presence of right 

artificial hip joint   





(2) Unsteady gait


Current Visit: Yes   Status: Acute   





(3) T2DM (type 2 diabetes mellitus)


Current Visit: Yes   Status: Chronic   


Qualifiers: 


   Diabetes mellitus complication status: with hyperglycemia   Diabetes 

mellitus complication detail: D   Diabetic retinopathy severity: D   

Proliferative retinopathy type: P   Diabetes mellitus macular edema: D   

Diabetes mellitus long term insulin use: without long term use   Laterality: L 

  Chronic kidney disease stage: C   Qualified Code(s): E11.65 - Type 2 diabetes 

mellitus with hyperglycemia   





(4) HTN (hypertension)


Current Visit: Yes   Status: Chronic   


Qualifiers: 


   Hypertension type: essential hypertension   Qualified Code(s): I10 - 

Essential (primary) hypertension   





Subjective


Date of service: 02/15/17


Principal diagnosis: right PATRICIA


Interval history: 


Pt seen with PT this afternoon, F/U IPR course, right PATRICIA.  No acute changes 

overnight; pain is controlled





Objective





- Constitutional


Vitals: 


 Vital Signs - 12hr











  02/15/17 02/15/17 02/15/17





  07:30 07:50 07:51


 


Temperature 98.7 F  


 


Pulse Rate   92 H


 


Pulse Rate [ 92 H  





Left Brachial]   


 


Respiratory 20 18 





Rate   


 


Blood Pressure   132/60


 


Blood Pressure 132/60  





[Left Arm]   


 


O2 Sat by Pulse 96  





Oximetry   














  02/15/17





  07:53


 


Temperature 


 


Pulse Rate 92 H


 


Pulse Rate [ 





Left Brachial] 


 


Respiratory 





Rate 


 


Blood Pressure 132/60


 


Blood Pressure 





[Left Arm] 


 


O2 Sat by Pulse 





Oximetry 











General appearance: Present: no acute distress





- EENT


Eyes: EOM intact


ENT: hearing intact





- Neck


Neck: supple, normal ROM





- Respiratory


Respiratory effort: normal


Extremity abnormal: edema (RLE; fluctuating)





- Gastrointestinal


General gastrointestinal: Present: soft, non-tender, non-distended





- Neurologic


Neurologic: CNII-XII intact, moves all extremities





- Psychiatric


Psychiatric: appropriate mood/affect, intact judgment & insight, memory intact, 

cooperative





- Allied health notes


Allied health notes reviewed: nursing (Kulwinder to supervision with ADLs), PT (Kulwinder 

to supervision for transfers; supervision for gait and stairs)





- Labs


CBC & Chem 7: 


 02/13/17 15:19





 02/13/17 15:19


Labs: 


 Abnormal lab results











  02/14/17 02/14/17 02/15/17 Range/Units





  17:05 21:17 06:26 


 


POC Glucose  128 H  158 H  182 H  ()

## 2017-02-16 NOTE — DISCHARGE SUMMARY
Providers





- Providers


Date of Admission: 


02/10/17 18:00





Date of discharge: 02/16/17


Attending physician: 


EMELY IVEY





 





02/10/17 20:14


Occupational Therapy Evaluate and Treat [CONS] Routine 


   Comment: 


   Reason For Exam: right PATRICIA


Physical Therapy Evaluation and Treat [CONS] Routine 


   Comment: 


   Reason For Exam: right PATRICIA











Primary care physician: 


Dr. Aj Myers








Hospitalization


Reason for admission: right PATRICIA


Condition: Stable


Hospital course: 


62 y.o. female with history of severe advanced osteoarthritis to the right hip, 

admitted for right PATRICIA.  Post-op course significant for acute blood loss anemia

; intermittent hypotension; decline in functional status.  Pt was admitted for 

aggressive therapies and ongoing medical management.  Pt noted to have fever 

and leukocytosis early in course; negative for UTI with resolution of fever and 

leukocytosis.  Incision site is healing well without any drainage.  Functionally

, pt progressed well.  On initial evaluation, pt required Nick/CGA for bed 

mobility, transfers, and gait; Independent to Nick for ADLs.  At the time of d/c

, pt progressed to Kul[place]inder for ADLs, transfers, and gait, ambulating >300 feet 

with RW; supervision with stairs.  Pt is stable for d/c home. 





Disposition: DISCHARGED TO HOME OR SELFCARE





- Discharge Diagnoses


(1) History of total hip replacement


Status: Acute   


Qualifiers: 


   Laterality: right   Qualified Code(s): Z96.641 - Presence of right 

artificial hip joint   





(2) Unsteady gait


Status: Acute   





(3) T2DM (type 2 diabetes mellitus)


Status: Chronic   


Qualifiers: 


   Diabetes mellitus complication status: with hyperglycemia   Diabetes 

mellitus complication detail: D   Diabetic retinopathy severity: D   

Proliferative retinopathy type: P   Diabetes mellitus macular edema: D   

Diabetes mellitus long term insulin use: without long term use   Laterality: L 

  Chronic kidney disease stage: C   Qualified Code(s): E11.65 - Type 2 diabetes 

mellitus with hyperglycemia   





(4) HTN (hypertension)


Status: Chronic   


Qualifiers: 


   Hypertension type: essential hypertension   Qualified Code(s): I10 - 

Essential (primary) hypertension   





Core Measure Documentation





- Palliative Care


Palliative Care/ Comfort Measures: Not Applicable





- Core Measures


Any of the following diagnoses?: none





Exam





- Constitutional


Vitals: 


 











Temp Pulse Resp BP Pulse Ox


 


 98.5 F   88   20   122/62   96 


 


 02/16/17 07:30  02/16/17 09:54  02/16/17 07:30  02/16/17 09:54  02/16/17 07:30











General appearance: Present: no acute distress





- EENT


Eyes: Present: EOM intact


ENT: hearing intact





- Neck


Neck: Present: supple, normal ROM





- Respiratory


Respiratory effort: normal





- Extremities


Extremity abnormal: edema (RLE; stable, no calf tenderness)





- Psychiatric


Psychiatric: appropriate mood/affect, intact judgment & insight, memory intact





- Neurologic


Neurologic: CNII-XII intact, moves all extremities





Plan


Activity: no driving until cleared by PCP, fall precautions, other (hip 

precautions)


Weight Bearing Status: Weight Bear as Tolerated


Diet: diabetic


Special Instructions: physical therapy ([facility])


Durable Medical Equipment Needed Upon Discharge: Walker-Rolling, Bedside Commode

, other ([facility])


Follow up with: 


PRIMARY CARE,MD [Primary Care Provider] - 7 Days


DANIE PRYOR MD [Staff Physician] - 7 Days


Prescriptions: 


Aspirin [Aspirin TAB] 325 mg PO BID #60 tablet


Celecoxib [celeBREX] 200 mg PO BID #60 capsule


Gabapentin [Neurontin] 300 mg PO Q8HR #90 capsule


oxyCODONE /ACETAMINOPHEN [Percocet 5/325 mg] 2 tab PO Q4H PRN #60 tablet


 PRN Reason: Pain, Moderate (4-6)

## 2017-12-21 NOTE — MAMMOGRAPHY REPORT
BILATERAL DIGITAL DIAGNOSTIC MAMMOGRAM with CAD and RIGHT BREAST 

ULTRASOUND: 12/20/17 08:16:00



CLINICAL: Palpable lump.



COMPARISON:12/19/16



FINDINGS: The breasts are almost entirely fatty.No mass, architectural 

distortion or suspicious calcifications. No mammographic finding at a 

palpable marker in the right axillary tail.  The marker overlies normal 

fat.   



Ultrasound of the right breast demonstrated a lymph node in the 

inferior right axilla measuring 1.4 x 0.6 x 0.9 cm.  It has normal 

morphology with central fat and correlates with the palpable finding.



IMPRESSION: No mammographic evidence of malignancy.A benign right 

axillary lymph node which appears to correlate with what the patient 

feels.



BI-RADS CATEGORY:  2 - - Benign



RECOMMENDATION: Routine mammographic screening in one year.



ACR BI-RADS MAMMOGRAPHIC CODES:

0 = Needs additional imaging evaluation; 1 = Negative; 2 = Benign; 3 = 

Probably benign; 4 = Suspicious; 5 = Malignant; 6 = Known biopsy-proven 

malignancy



COMMENT:

      1.   Dense breast tissue, i.e., adenosis, fibrocystic 

            changes, etc., may obscure an underlying neoplasm.

      2.   Approximately 10% of cancers are not detected with

            mammography.

      3.   A negative mammography report should not delay biopsy 

            if a clinically suspicious mass marker.  present.





COMMENT:

Patient follow-up letters are generated by our [employer] application.

## 2017-12-21 NOTE — ULTRASOUND REPORT
BILATERAL DIGITAL DIAGNOSTIC MAMMOGRAM with CAD and RIGHT BREAST 

ULTRASOUND: 12/20/17 08:16:00



CLINICAL: Palpable lump.



COMPARISON:12/19/16



FINDINGS: The breasts are almost entirely fatty.No mass, architectural 

distortion or suspicious calcifications. No mammographic finding at a 

palpable marker in the right axillary tail.  The marker overlies normal 

fat.   



Ultrasound of the right breast demonstrated a lymph node in the 

inferior right axilla measuring 1.4 x 0.6 x 0.9 cm.  It has normal 

morphology with central fat and correlates with the palpable finding.



IMPRESSION: No mammographic evidence of malignancy.A benign right 

axillary lymph node which appears to correlate with what the patient 

feels.



BI-RADS CATEGORY:  2 - - Benign



RECOMMENDATION: Routine mammographic screening in one year.



ACR BI-RADS MAMMOGRAPHIC CODES:

0 = Needs additional imaging evaluation; 1 = Negative; 2 = Benign; 3 = 

Probably benign; 4 = Suspicious; 5 = Malignant; 6 = Known biopsy-proven 

malignancy



COMMENT:

      1.   Dense breast tissue, i.e., adenosis, fibrocystic 

            changes, etc., may obscure an underlying neoplasm.

      2.   Approximately 10% of cancers are not detected with

            mammography.

      3.   A negative mammography report should not delay biopsy 

            if a clinically suspicious mass marker.  present.





COMMENT:

Patient follow-up letters are generated by our [employer] application.

## 2018-02-15 NOTE — XRAY REPORT
ROUTINE CHEST, TWO VIEWS:



HISTORY:  Hypertension.



The trachea, heart, mediastinal contour, lung fields and bony thorax 

are unremarkable.



IMPRESSION:

Unremarkable chest x-ray.

## 2018-05-31 NOTE — SHORT STAY SUMMARY
Short Stay Documentation





- Allergies and Medications


Current Medications: 


 Allergies





No Known Allergies Allergy (Verified 01/25/17 10:05)


 





 Home Medications











 Medication  Instructions  Recorded  Confirmed  Last Taken  Type


 


Fenofibrate [Tricor] 145 mg PO DAILY  tablet 02/10/17 05/21/18 03/21/18 Rx


 


AtorvaSTATin [Lipitor] 40 mg PO DAILY #30 tablet 03/26/18 05/21/18 Unknown Rx


 


HYDROcodone/APAP 7.5-325 [Norco 1 each PO Q6HR PRN #20 tablet 03/26/18 05/21/18 

Unknown Rx





7.5/325]     


 


Metformin HCl [Metformin HCl ER] 500 mg PO BID #60 [id number] 03/26/18 05/21/18 

Unknown Rx


 


Potassium Chloride [Klor-Con 10] 10 meq PO DAILY #30 tablet.er 03/26/18 05/21/ 18 Unknown Rx


 


Pregabalin [Lyrica] 50 mg PO BID #60 capsule 03/26/18 05/21/18 Unknown Rx


 


Valsartan [Diovan] 160 mg PO QDAY #30 tablet 03/26/18 05/21/18 Unknown Rx


 


amLODIPine/VALSARTAN [Exforge 1 tab PO DAILY #30 tablet 03/26/18 05/21/18 

Unknown Rx





5-160 mg Tablet]     


 


Aspirin [Aspirin TAB] 325 mg PO QDAY 05/21/18 05/21/18 Unknown History








Active Medications





Hydromorphone HCl (Dilaudid)  0.25 mg IV Q10MIN PRN


   PRN Reason: Pain, Moderate (4-6)


   Stop: 05/31/18 13:00


Lactated Ringer's (Lactated Ringers)  1,000 mls @ 100 mls/hr IV AS DIRECT TABITHA


   Last Admin: 05/31/18 07:12 Dose:  100 mls/hr


Midazolam HCl (Versed)  2 mg IV PREOP NR


   Stop: 05/31/18 23:59


   Last Admin: 05/31/18 07:13 Dose:  2 mg











Short Stay Discharge Plan


Activity: no restrictions, advance as tolerated


Weight Bearing Status: Full Weight Bearing


Diet: regular


Wound: open to air


Special Instructions: physical therapy


Durable Medical Equipment Needed Upon Discharge: CPM


Additional Instructions: 


Work on knee ROM exercises. Work on knee flexion and extension.


PT staright for 10 days. 


do knee exercises when at home to maintain flexion.


cpm from 0-125/1340 deg 8-10 hours a day.


Also do active knee ROM exercises.


Pain meds PRN.





Follow up with: 


KRISTI YOUNGBLOOD MD, PHD [Primary Care Provider] - 7 Days


DANIE PRYOR MD [Staff Physician] - 14 Days

## 2018-05-31 NOTE — OPERATIVE REPORT
PREOPERATIVE DIAGNOSIS:  Arthrofibrosis of the right knee, status post total 
knee replacement.



POSTOPERATIVE DIAGNOSIS:  Arthrofibrosis of the right knee, status post total 
knee replacement.



PROCEDURE PERFORMED:  Manipulation of the right knee under anesthesia.



BRIEF HISTORY:  The patient had an uneventful and well done total knee 
replacement.  However, postoperatively, she ended up having stiffness of the 
knee and arthrofibrosis and healed with significant scar tissue and decided to 
go for manipulation.  She is about 9-10 weeks out from a total knee replacement.



DESCRIPTION OF PROCEDURE:  The patient was taken to the operating room, smooth 
anesthesia and muscle relaxation, the right knee was held in between the arm 
and the body and gradually manipulated.  Gradual flexion was performed keeping 
the ear close to the knee and gradual knee bending was performed and I was able 
to flex the knee up to 130 degrees.  We had full extension after manipulation 
and full flexion.  Pre-manipulation, the knee range of motion was fairly close 
to full extension to about 60-degree flexion.  Post-manipulation of the knee, 
the range of motion was from almost zero degree extension to 130-degree 
flexion.  After the manipulation, there was no complication.  Intraoperative 
fluoroscopic images of the knee was performed on AP and lateral view.  No 
fracture seen on C-arm images.  During the manipulation, there was significant 
amount of scar tissue, which we could ____ being released and lead to full 
range of motion.  The patient tolerated procedure very well.  Ice packs were 
applied, shifted to recovery room in stable condition.
MARY ELLEN

## 2018-05-31 NOTE — ANESTHESIA CONSULTATION
Anesthesia Consult and Med Hx


Date of service: 05/31/18





- Airway


Anesthetic Teeth Evaluation: Dentures


ROM Head & Neck: Adequate


Mental/Hyoid Distance: Adequate


Mallampati Class: Class II


Intubation Access Assessment: Good





- Pulmonary Exam


CTA: Yes





- Cardiac Exam


Cardiac Exam: RRR





- Pre-Operative Health Status


ASA Pre-Surgery Classification: ASA2


Proposed Anesthetic Plan: General





- Pulmonary


Hx Smoking: No


Hx Sleep Apnea: No (MARK PRE SCREEN HIGH RISK.)





- Cardiovascular System


Hx Hypertension: Yes (X 21 YRS)





- Central Nervous System


Hx Back Pain: Yes


Hx Psychiatric Problems: No





- Endocrine


Hx Non-Insulin Dependent Diabetes: Yes





- Hematic


Hx Anemia: Yes (AND LOW IRON LEVELS)





- Other Systems


Hx Alcohol Use: Yes (socially, a glass of wine per month)


Hx Substance Use: No


Hx Cancer: No


Hx Obesity: Yes

## 2018-05-31 NOTE — OPERATIVE REPORT
PREOPERATIVE DIAGNOSIS:  Arthrofibrosis of the right knee, status post total

knee replacement.



POSTOPERATIVE DIAGNOSIS:  Arthrofibrosis of the right knee, status post total

knee replacement.



PROCEDURE PERFORMED:  Manipulation of the right knee under anesthesia.



BRIEF HISTORY:  The patient had an uneventful and well done total knee

replacement.  However, postoperatively, she ended up having stiffness of the

knee and arthrofibrosis and healed with significant scar tissue and decided to

go for manipulation.  She is about 9-10 weeks out from a total knee replacement.



DESCRIPTION OF PROCEDURE:  The patient was taken to the operating room, smooth

anesthesia and muscle relaxation, the right knee was held in between the arm and

the body and gradually manipulated.  Gradual flexion was performed keeping the

ear close to the knee and gradual knee bending was performed and I was able to

flex the knee up to 130 degrees.  We had full extension after manipulation and

full flexion.  Pre-manipulation, the knee range of motion was fairly close to

full extension to about 60-degree flexion.  Post-manipulation of the knee, the

range of motion was from almost zero degree extension to 130-degree flexion. 

After the manipulation, there was no complication.  Intraoperative fluoroscopic

images of the knee was performed on AP and lateral view.  No fracture seen on

C-arm images.  During the manipulation, there was significant amount of scar

tissue, which we could ____ being released and lead to full range of motion. 

The patient tolerated procedure very well.  Ice packs were applied, shifted to

recovery room in stable condition.





DD: 05/31/2018 09:05

DT: 05/31/2018 09:35

JOB# [id number]  [id number]

SK/DYLAN

## 2018-06-01 NOTE — XRAY REPORT
Right knee 2 views:



History: Arthrofibrosis right knee.



Findings:



There is total knee replacement noted. The tibial and femoral component 

appears anatomic in alignment. No fracture.



Impression:



Stable arthroplasty.

## 2018-08-28 NOTE — CAT SCAN REPORT
FINAL REPORT



EXAM:  CT LOWER EXTREMITY RT WO CON



HISTORY:  PAIN IN RIGHT KNEE 



TECHNIQUE:  Spiral CT scanning of the right lower extremity from

hip to mid tibia. No IV contrast administered. Multiplanar

reformations.



PRIORS:  Right knee radiographs, 22 March 2018.



FINDINGS:  

Status post right total hip and knee arthroplasties grossly

intact and normally located. Beam hardening artifact from

indwelling hardware limits evaluation. Possible heterotopic

ossification or myositis ossificans noted about the distal femur

and proximal tibia, with sclerotic appearance, probably chronic.

Small suprapatellar joint effusion and some overlying soft tissue

edema. 



No acute fracture, dislocation or obvious osseous destruction.

Remainder of soft tissues grossly unremarkable.



IMPRESSION:  

1. No apparent, acute osseous abnormality. 



2. Postsurgical changes and small knee joint effusion.

## 2019-12-23 ENCOUNTER — HOSPITAL ENCOUNTER (OUTPATIENT)
Dept: HOSPITAL 5 - SPVWC | Age: 64
Discharge: HOME | End: 2019-12-23
Attending: SPECIALIST
Payer: MEDICARE

## 2019-12-23 DIAGNOSIS — Z12.31: Primary | ICD-10-CM

## 2019-12-23 PROCEDURE — 77067 SCR MAMMO BI INCL CAD: CPT

## 2019-12-23 NOTE — MAMMOGRAPHY REPORT
DIGITAL SCREENING MAMMOGRAM WITH CAD, 12/23/2019



INDICATION: Routine screening mammography. 



TECHNIQUE:  Digital bilateral  2D mammography was obtained in the craniocaudal and mediolateral obliq
ue projections. This examination was interpreted with the benefit of Computer-Aided Detection analysi
s.



COMPARISON: 12/21/2018



FINDINGS: 



Breast Density: There are scattered areas of fibroglandular density.



There is no evidence of dominant mass, suspicious calcifications or architectural distortion in eithe
r breast.



IMPRESSION: No mammographic evidence of malignancy.





Follow up recommendation: Routine yearly



BI-RADS Category 1:  Negative.



A "normal" or negative report should not discourage follow up or biopsy of a clinically significant f
inding.



A written summary of these findings will be mailed to the patient. The patient will be entered into a
 mammography reporting system which will generate a reminder letter for the patient's next appointmen
t at the appropriate interval.



The American College of Radiology recommends yearly mammograms starting at age 40 and continuing as l
elijah as a woman is in good health.  Breast MRI is recommended for women with an approximate 20-25% or 
greater lifetime risk of breast cancer, including women with a strong family history of breast or ova
rigoberto cancer or who have been treated for Hodgkin's disease.



Signer Name: Sean Scott MD 

Signed: 12/23/2019 2:58 PM

 Workstation Name: [id number]

## 2021-12-27 NOTE — MAMMOGRAPHY REPORT
DEXA BONE DENSITY SCAN



INDICATION / CLINICAL INFORMATION:

Z78.0 ASYMPTOMATIC MENOPAUSAL STATE.

66 years Female



COMPARISON: 

12/21/2018



LUMBAR SPINE, L1-L4:

- Bone mineral density (BMD) = 1.115 g/cm2.

- T-score = -0.3 

- Z-score = 1.8 



Change (%) since most recent prior (if available):  -1.6



LEFT FOREARM, TOTAL :

- Bone mineral density (BMD) = 0.577 g/cm2.

- T-score = 0.2 

- Z-score = 2.2 



Change (%) since most recent prior (if available):  -4.3







IMPRESSION:

1. WHO Classification: Normal bone density. Fracture Risk: Not Increased.





Note: 10-Year Fracture Risk (FRAX) not reported. This DEXA unit lacks FRAX functionality.







=================================================================

BMD Reporting Guidelines (ISCD, 2015)

=================================================================

BMD Reporting in Postmenopausal Women and in Men Age 50 and Older

- T-scores are preferred.

- The WHO densitometric classification is applicable.



BMD Reporting in Females Prior to Menopause and in Males Younger Than Age 50

- Z-scores, not T-scores, are preferred. This is particularly important in children.

- A Z-score of -2.0 or lower is defined as below the expected range for age, and a Z-score above -2.0
 is within the expected range for age.

- Osteoporosis cannot be diagnosed in men under age 50 on the basis of BMD alone.

- The WHO diagnostic criteria may be applied to women in the menopausal transition.





http://www.iscd.org/official-positions/0046-xyjx-vfwgewup-positions-adult/





Signer Name: Juan Camacho MD 

Signed: 12/27/2021 10:44 AM

Workstation Name: [email or web address]

## 2021-12-27 NOTE — MAMMOGRAPHY REPORT
DIGITAL SCREENING MAMMOGRAM WITH CAD, 12/27/2021



CLINICAL INFORMATION / INDICATION: Routine screening mammography. SCREENING MAMMO Z12.31



TECHNIQUE:  Digital bilateral 2D mammography was obtained in the craniocaudal and mediolateral obliqu
e projections. This examination was interpreted with the benefit of Computer-Aided Detection analysis
.



COMPARISON: 11/30/2012 through 12/24/2020.



FINDINGS: 



Breast Density: The breasts are almost entirely fatty.



No dominant mass, suspicious calcifications, or architectural distortion in either breast. 



Partially calcified oil cysts in the right lower inner quadrant are again identified.

 

IMPRESSION: No mammographic evidence of malignancy.



Follow up recommendation: Routine yearly



BI-RADS Category 2:  BENIGN.





-------------------------------------------------------------------------------------------

A "normal" or negative report should not discourage follow up or biopsy of a clinically significant f
inding.



A written summary of these findings will be mailed to the patient. The patient will be entered into a
 mammography reporting system which will generate a reminder letter for the patient's next appointmen
t at the appropriate interval.



The American College of Radiology recommends yearly mammograms starting at age 40 and continuing as l
elijah as a woman is in good health.  Breast MRI is recommended for women with an approximate 20-25% or 
greater lifetime risk of breast cancer, including women with a strong family history of breast or ova
rigoberto cancer or who have been treated for Hodgkin's disease.



Signer Name: Alphonso Stevenson MD 

Signed: 12/27/2021 10:46 AM

Workstation Name: [email or web address]